# Patient Record
Sex: MALE | Race: BLACK OR AFRICAN AMERICAN | NOT HISPANIC OR LATINO | URBAN - METROPOLITAN AREA
[De-identification: names, ages, dates, MRNs, and addresses within clinical notes are randomized per-mention and may not be internally consistent; named-entity substitution may affect disease eponyms.]

---

## 2023-11-06 ENCOUNTER — TELEPHONE (OUTPATIENT)
Dept: NEPHROLOGY | Facility: CLINIC | Age: 48
End: 2023-11-06
Payer: COMMERCIAL

## 2023-11-06 DIAGNOSIS — N17.9 AKI (ACUTE KIDNEY INJURY) (H): Primary | ICD-10-CM

## 2023-11-06 NOTE — TELEPHONE ENCOUNTER
[facility] Call Center    Phone Message    May a detailed message be left on voicemail: yes     Reason for Call: Order(s): Other:   Reason for requested: Labs  Date needed: asap  Provider name: Dr. Rowena    Labs in [place] on 11/16/23    Action Taken: Message routed to:  Other: [facility]    Travel Screening: Not Applicable

## 2023-11-15 ENCOUNTER — TELEPHONE (OUTPATIENT)
Dept: NEPHROLOGY | Facility: CLINIC | Age: 48
End: 2023-11-15
Payer: COMMERCIAL

## 2023-11-15 NOTE — TELEPHONE ENCOUNTER
New pt appt scheduled for 12/15 at 9:30 am virtual, (after provider cancel on 11/22) pt's spuse in agreement to get non-fasting labs done a few days prior at convenient [facility] location. Lab orders needed.  Maribel Peng,  Nephrology Clinic Navigator  [facility]  Direct: [phone].

## 2023-11-15 NOTE — CONFIDENTIAL NOTE
DIAGNOSIS:  renal failure, med recs with [facility]    DATE RECEIVED:  12.15.2023   NOTES STATUS DETAILS   OFFICE NOTE from referring provider     OFFICE NOTE from other specialist  Care Everywhere 10.16.2023 Angelika Mathur NP  [facility]   *Only VASCULITIS or LUPUS gather office notes for the following     *PULMONARY       *ENT     *DERMATOLOGY     *RHEUMATOLOGY     DISCHARGE SUMMARY from hospital     DISCHARGE REPORT from the ER Care Everywhere 10.20.2023 [facility]    MEDICATION LIST Care Everywhere    IMAGING  (NEED IMAGES AND REPORTS)     KIDNEY CT SCAN     KIDNEY ULTRASOUND Care Everywhere 10.21.2023 US Renal Bladder [facility]   MR ABDOMEN     NUCLEAR MEDICINE RENAL     LABS     CBC Care Everywhere 11.13.2023   CMP Care Everywhere 11.13.2023   BMP Care Everywhere 11.13.2023    UA Care Everywhere 11.13.2023   URINE PROTEIN Care Everywhere 11.13.2023   RENAL PANEL     BIOPSY     KIDNEY BIOPSY          Action 11.15.2023 RM   Action Taken Pending image     Action 11.16.2023 RM   Action Taken Called [facility] at [phone] image received and uploaded to chart

## 2023-11-30 NOTE — PROGRESS NOTES
[facility] Cardiology Consultation:    Assessment and Plan:   1. Hypertension, : Will start combination of  amlodipine- benazepril 5/10, BMP in 1 week  2. Mild Hyperlipidemia : Given muscle aches with Crestor, switch to Lipitor  3.  Mild obesity ( BMI 41), high muscle mass.   4. Type 2 diabetes, controlled: Consider GLP-1 agonist  5. AZALIA, Resolved.     Matt Hernandez MD  Cardiology fellow (PGY4)  [id number]    [facility] Cardiovascular Division    I have seen and examined the patient, reviewed labs and tests. I have discussed my findings and treatment recommendations with the house staff and/or Cardiology fellow and agree with their assessment and plan as outlined in the note.    Mynor Landaverde MD    Cardiac Imaging and Prevention  [facility]  Pager: [phone]    HPI:   Addison Sharma is a 48-year-old man with medical history of hypertension, dyslipidemia, type 2 diabetes, obesity, recent admission on 10/20- 10/22/23. He was found to have AZALIA during PCP visit. Referred to cardiology for hypertension management .     He notes he was initially diagnosed with hypertension around 2013 and has been maintained on combination of HCTZ and triamterene.  Notes was a simply started on spironolactone and his combination pill was uptitrated 3 weeks prior to his presentation with AZALIA.  His creatinine has since normalized.  Suspect this was likely related to medication changes.    He notes his blood pressure at home is in the 130s to 140s range on amlodipine 10.  Measure his blood pressure reportedly at home.    He has tried Crestor for 2 weeks in the past.  Stopped due to muscle aches and pains.  Is willing to try Lipitor.  ASCVD 12%. ( 22% by race)     Denies exertional chest pain, shortness of breath , orthopnea, paroxysmal nocturnal dyspnea, palpitations, syncope or presyncope. Functional status is excellent.  Reports weight lifting 5 times a week about 6 months to  "a year ago.  Stopped due to anxiety related to cardiac issues after one of his friends passed away while working on the treadmill.  No limitations in functional status.    He has had a repeat EKG on 1023 that did not show any significant changes.  Cannot see the actual strips.  This was done at [facility].      Lab Results   Component Value Date    CHOL 198 02/09/2012     Lab Results   Component Value Date    HDL 41 02/09/2012     Lab Results   Component Value Date     02/09/2012     Lab Results   Component Value Date    TRIG 137 02/09/2012     Lab Results   Component Value Date    CHOLHDLRATIO 4.8 02/09/2012         EXAM:  BP (!) 187/104 (BP Location: Right arm, Patient Position: Sitting, Cuff Size: Adult Large)   Pulse 102   Ht 1.848 m (6' 0.75\")   Wt 147.9 kg (326 lb)   SpO2 99%   BMI 43.31 kg/m    GEN/CONSTITUIONAL: Appears comfortable, in no apparent distress   EYES: No icterus  ENT/MOUTH: Normal  JVP:  Not visible  RESPIRATORY: Clear to auscultation bilaterally   CARDIOVASCULAR: Regular S1 and S2, no murmurs, rubs, or gallops.   ABDOMEN: Soft, non-tender, positive bowel sounds   NEUROLOGIC: Grossly non-focal   PSYCHIATRIC: Normal affect  EXT: No cyanosis, clubbing, edema. Normal pedal pulses.  Skin: No petechiae, purpura or rash    PAST MEDICAL HISTORY:  Past Medical History:   Diagnosis Date    NO ACTIVE PROBLEMS        CURRENT MEDICATIONS:  Current Outpatient Medications   Medication    ibuprofen (ASHLEY IBUPROFEN) 200 MG capsule    lisinopril (PRINIVIL,ZESTRIL) 20 MG tablet     No current facility-administered medications for this visit.       PAST SURGICAL HISTORY:  Past Surgical History:   Procedure Laterality Date    ORTHOPEDIC SURGERY      toe       ALLERGIES   No Known Allergies    FAMILY HISTORY:  No family history on file.    SOCIAL HISTORY:  Social History     Socioeconomic History    Marital status:      Spouse name: Not on file    Number of children: Not on file    Years of education: " Not on file    Highest education level: Not on file   Occupational History    Not on file   Tobacco Use    Smoking status: Never    Smokeless tobacco: Never   Substance and Sexual Activity    Alcohol use: No     Comment: none    Drug use: No    Sexual activity: Yes   Other Topics Concern    Parent/sibling w/ CABG, MI or angioplasty before 65F 55M? No   Social History Narrative    Not on file     Social Determinants of Health     Financial Resource Strain: Not on file   Food Insecurity: Not on file   Transportation Needs: Not on file   Physical Activity: Not on file   Stress: Not on file   Social Connections: Not on file   Interpersonal Safety: Not on file   Housing Stability: Not on file       ROS:   Constitutional: No fever, chills, or sweats. No weight gain/loss   ENT: No visual disturbance, ear ache, epistaxis, sore throat  Allergies/Immunologic: Negative.   Respiratory: No cough, hemoptysia  Cardiovascular: As per HPI  GI: No nausea, vomiting, hematemesis, melena, or hematochezia  : No urinary frequency, dysuria, or hematuria  Integument: Negative  Psychiatric: Negative  Neuro: Negative  Endocrinology: Negative   Musculoskeletal: Negative    ADDITIONAL COMMENTS:     I reviewed the patient's medications:     I reviewed the patient's pertinent clinical laboratory studies:     I reviewed the patient's pertinent imaging studies:   I reviewed the patient's ECG:

## 2023-12-01 ENCOUNTER — OFFICE VISIT (OUTPATIENT)
Dept: CARDIOLOGY | Facility: CLINIC | Age: 48
End: 2023-12-01
Payer: COMMERCIAL

## 2023-12-01 VITALS
SYSTOLIC BLOOD PRESSURE: 187 MMHG | HEART RATE: 102 BPM | WEIGHT: 315 LBS | HEIGHT: 73 IN | OXYGEN SATURATION: 99 % | DIASTOLIC BLOOD PRESSURE: 104 MMHG | BODY MASS INDEX: 41.75 KG/M2

## 2023-12-01 DIAGNOSIS — I10 HYPERTENSION GOAL BP (BLOOD PRESSURE) < 140/90: Primary | ICD-10-CM

## 2023-12-01 PROCEDURE — 99204 OFFICE O/P NEW MOD 45 MIN: CPT | Mod: GC | Performed by: INTERNAL MEDICINE

## 2023-12-01 RX ORDER — ATORVASTATIN CALCIUM 20 MG/1
20 TABLET, FILM COATED ORAL DAILY
Qty: 90 TABLET | Refills: 3 | Status: SHIPPED | OUTPATIENT
Start: 2023-12-01

## 2023-12-01 RX ORDER — L. ACIDOPHILUS/BIFIDO. LONGUM 15 MG
1 CAPSULE,DELAYED RELEASE (ENTERIC COATED) ORAL DAILY
COMMUNITY
Start: 2023-03-21

## 2023-12-01 RX ORDER — AMLODIPINE AND BENAZEPRIL HYDROCHLORIDE 5; 10 MG/1; MG/1
1 CAPSULE ORAL DAILY
Qty: 90 CAPSULE | Refills: 3 | Status: SHIPPED | OUTPATIENT
Start: 2023-12-01 | End: 2024-05-14

## 2023-12-01 RX ORDER — ASPIRIN 81 MG/1
81 TABLET ORAL DAILY
COMMUNITY
Start: 2023-10-05

## 2023-12-01 RX ORDER — AMLODIPINE AND BENAZEPRIL HYDROCHLORIDE 5; 10 MG/1; MG/1
1 CAPSULE ORAL DAILY
Qty: 90 CAPSULE | Refills: 3 | Status: SHIPPED | OUTPATIENT
Start: 2023-12-01 | End: 2023-12-01

## 2023-12-01 RX ORDER — ROSUVASTATIN CALCIUM 20 MG/1
1 TABLET, COATED ORAL DAILY
COMMUNITY
Start: 2023-03-20 | End: 2023-12-01

## 2023-12-01 RX ORDER — AMLODIPINE BESYLATE 10 MG/1
1 TABLET ORAL DAILY
COMMUNITY
Start: 2023-11-18 | End: 2023-12-01

## 2023-12-01 RX ORDER — VALSARTAN AND HYDROCHLOROTHIAZIDE 80; 12.5 MG/1; MG/1
1 TABLET, FILM COATED ORAL DAILY
Qty: 60 TABLET | Refills: 1 | Status: CANCELLED | OUTPATIENT
Start: 2023-12-01 | End: 2024-03-30

## 2023-12-01 RX ORDER — GLIPIZIDE 5 MG/1
5 TABLET ORAL DAILY
COMMUNITY
Start: 2023-10-27

## 2023-12-01 NOTE — PATIENT INSTRUCTIONS
Take your medicines every day, as directed     Changes made today:  Stop amlodipine  Start Lotrel 1 pill every day   Lab in 1 week  Stop crestor  Start lipitor       Cardiology Care Coordinators:      Loren FLORES RN         Phone  [phone]      Fax [phone]    To Contact us     During Business Hours:  [phone]     If you are needing refills please contact your pharmacy.     For urgent after hour care please call the [facility] at [phone] or the [facility] at [phone] and ask to speak to the cardiologist on call.            HOW TO CHECK YOUR BLOOD PRESSURE AT HOME:     Avoid eating, smoking, and exercising for at least 30 minutes before taking a reading.     Be sure you have taken your BP medication at least 2-3 hours before you check it.      Sit quietly for 10 minutes before a reading.      Sit in a chair with your feet flat on the floor. Rest your  arm on a table so that the arm cuff is at the same level as your heart.     Remain still during the reading.  Record your blood pressure and pulse in a log and bring to your next appointment.       Use [facility] allows you to communicate directly with your heart team through secure messaging.  [facility] can be accessed any time on your phone, computer, or tablet.  If you need assistance, we'd be happy to help!             Keep your Heart Appointments:

## 2023-12-01 NOTE — LETTER
12/1/2023        RE: Addison Sharma  [address] [address] [place] [address]        [facility] Cardiology Consultation:    Assessment and Plan:   1. Hypertension, : Will start combination of  amlodipine- benazepril , BMP in 1 week  2. Mild Hyperlipidemia : Start Atorvastatin, Lipids in 6 months   3. Obesity ( BMI 41) : Mostly muscle mass.   4. Type 2 diabetes (A1c: 7% on 10/23) : Has  discussed GLP 1 with PCP   5. Left renal mass : Noted on renal U/S during recent admission, possible complex cyst. PCP planning for contrast enhanced MRI or CT renal mass protocol pending improvement in Scr. Has follow-up with nephrology on 12/15.   6. AZALIA, Resolved. No documented hx of CKD. Likley related to combination of hydrochlorothiazide- Triamterine and spironolactone. Has follow up with nephrology .     Matt Hernandez MD  Cardiology fellow (PGY4)  4830    RTC in 1 year     HPI:   Addison Sharma is a 48-year-old man with medical history of hypertension, dyslipidemia, type 2 diabetes, obesity, recent admission on 10/20- 10/22/23. He was found to have AZALIA during PCP visit. Referred to cardiology for hypertension management .     He notes he was initially diagnosed with hypertension around 2013 and has been maintained on combination of HCTZ and triamterene.  Notes was a simply started on spironolactone and his combination pill was uptitrated 3 weeks prior to his presentation with AZALIA.  His creatinine has since normalized.  Suspect this was likely related to medication changes.    He notes his blood pressure at home is in the 130s to 140s range on amlodipine 10.  Measure his blood pressure reportedly at home.    He has tried Crestor for 2 weeks in the past.  Stopped due to muscle aches and pains.  Is willing to try Lipitor.  ASCVD 12%. ( 22% by race)     Denies exertional chest pain, shortness of breath , orthopnea, paroxysmal nocturnal dyspnea, palpitations, syncope or presyncope. Functional status is  excellent.  Reports weight lifting 5 times a week about 6 months to a year ago.  Stopped due to anxiety related to cardiac issues after one of his friends passed away while working on the treadmill.  No limitations in functional status.    He has had a repeat EKG on 1023 that did not show any significant changes.  Cannot see the actual strips.  This was done at [facility].      Lab Results   Component Value Date    CHOL 198 02/09/2012     Lab Results   Component Value Date    HDL 41 02/09/2012     Lab Results   Component Value Date     02/09/2012     Lab Results   Component Value Date    TRIG 137 02/09/2012     Lab Results   Component Value Date    CHOLHDLRATIO 4.8 02/09/2012         EXAM:  There were no vitals taken for this visit.  GEN/CONSTITUIONAL: Appears comfortable, in no apparent distress   EYES: No icterus  ENT/MOUTH: Normal  JVP:  Not visible  RESPIRATORY: Clear to auscultation bilaterally   CARDIOVASCULAR: Regular S1 and S2, no murmurs, rubs, or gallops.   ABDOMEN: Soft, non-tender, positive bowel sounds   NEUROLOGIC: Grossly non-focal   PSYCHIATRIC: Normal affect  EXT: No cyanosis, clubbing, edema. Normal pedal pulses.  Skin: No petechiae, purpura or rash    PAST MEDICAL HISTORY:  Past Medical History:   Diagnosis Date     NO ACTIVE PROBLEMS        CURRENT MEDICATIONS:  Current Outpatient Medications   Medication     ibuprofen (ASHLEY IBUPROFEN) 200 MG capsule     lisinopril (PRINIVIL,ZESTRIL) 20 MG tablet     No current facility-administered medications for this visit.       PAST SURGICAL HISTORY:  Past Surgical History:   Procedure Laterality Date     ORTHOPEDIC SURGERY      toe       ALLERGIES   No Known Allergies    FAMILY HISTORY:  No family history on file.    SOCIAL HISTORY:  Social History     Socioeconomic History     Marital status:      Spouse name: Not on file     Number of children: Not on file     Years of education: Not on file     Highest education level: Not on file   Occupational  History     Not on file   Tobacco Use     Smoking status: Never     Smokeless tobacco: Never   Substance and Sexual Activity     Alcohol use: No     Comment: none     Drug use: No     Sexual activity: Yes   Other Topics Concern     Parent/sibling w/ CABG, MI or angioplasty before 65F 55M? No   Social History Narrative     Not on file     Social Determinants of Health     Financial Resource Strain: Not on file   Food Insecurity: Not on file   Transportation Needs: Not on file   Physical Activity: Not on file   Stress: Not on file   Social Connections: Not on file   Interpersonal Safety: Not on file   Housing Stability: Not on file       ROS:   Constitutional: No fever, chills, or sweats. No weight gain/loss   ENT: No visual disturbance, ear ache, epistaxis, sore throat  Allergies/Immunologic: Negative.   Respiratory: No cough, hemoptysia  Cardiovascular: As per HPI  GI: No nausea, vomiting, hematemesis, melena, or hematochezia  : No urinary frequency, dysuria, or hematuria  Integument: Negative  Psychiatric: Negative  Neuro: Negative  Endocrinology: Negative   Musculoskeletal: Negative    ADDITIONAL COMMENTS:     I reviewed the patient's medications:     I reviewed the patient's pertinent clinical laboratory studies:     I reviewed the patient's pertinent imaging studies:   I reviewed the patient's ECG:         [facility] Cardiology Consultation:    Assessment and Plan:   1. Hypertension, : Will start combination of  amlodipine- benazepril 5/10, BMP in 1 week  2. Mild Hyperlipidemia : Given muscle aches with Crestor, switch to Lipitor  3.  Mild obesity ( BMI 41), high muscle mass.   4. Type 2 diabetes, controlled: Consider GLP-1 agonist  5. AZALIA, Resolved.     Matt Hernandez MD  Cardiology fellow (PGY4)  4830    [facility] Cardiovascular Division    I have seen and examined the patient, reviewed labs and tests. I have discussed my findings and treatment recommendations with the house staff  and/or Cardiology fellow and agree with their assessment and plan as outlined in the note.    Mynor Landaverde MD    Cardiac Imaging and Prevention  [facility]  Pager: [phone]    HPI:   Addison Sharma is a 48-year-old man with medical history of hypertension, dyslipidemia, type 2 diabetes, obesity, recent admission on 10/20- 10/22/23. He was found to have AZALIA during PCP visit. Referred to cardiology for hypertension management .     He notes he was initially diagnosed with hypertension around 2013 and has been maintained on combination of HCTZ and triamterene.  Notes was a simply started on spironolactone and his combination pill was uptitrated 3 weeks prior to his presentation with AZALIA.  His creatinine has since normalized.  Suspect this was likely related to medication changes.    He notes his blood pressure at home is in the 130s to 140s range on amlodipine 10.  Measure his blood pressure reportedly at home.    He has tried Crestor for 2 weeks in the past.  Stopped due to muscle aches and pains.  Is willing to try Lipitor.  ASCVD 12%. ( 22% by race)     Denies exertional chest pain, shortness of breath , orthopnea, paroxysmal nocturnal dyspnea, palpitations, syncope or presyncope. Functional status is excellent.  Reports weight lifting 5 times a week about 6 months to a year ago.  Stopped due to anxiety related to cardiac issues after one of his friends passed away while working on the treadmill.  No limitations in functional status.    He has had a repeat EKG on 1023 that did not show any significant changes.  Cannot see the actual strips.  This was done at [facility].      Lab Results   Component Value Date    CHOL 198 02/09/2012     Lab Results   Component Value Date    HDL 41 02/09/2012     Lab Results   Component Value Date     02/09/2012     Lab Results   Component Value Date    TRIG 137 02/09/2012     Lab Results   Component Value Date    CHOLHDLRATIO 4.8  "02/09/2012         EXAM:  BP (!) 187/104 (BP Location: Right arm, Patient Position: Sitting, Cuff Size: Adult Large)   Pulse 102   Ht 1.848 m (6' 0.75\")   Wt 147.9 kg (326 lb)   SpO2 99%   BMI 43.31 kg/m    GEN/CONSTITUIONAL: Appears comfortable, in no apparent distress   EYES: No icterus  ENT/MOUTH: Normal  JVP:  Not visible  RESPIRATORY: Clear to auscultation bilaterally   CARDIOVASCULAR: Regular S1 and S2, no murmurs, rubs, or gallops.   ABDOMEN: Soft, non-tender, positive bowel sounds   NEUROLOGIC: Grossly non-focal   PSYCHIATRIC: Normal affect  EXT: No cyanosis, clubbing, edema. Normal pedal pulses.  Skin: No petechiae, purpura or rash    PAST MEDICAL HISTORY:  Past Medical History:   Diagnosis Date     NO ACTIVE PROBLEMS        CURRENT MEDICATIONS:  Current Outpatient Medications   Medication     ibuprofen (ASHLEY IBUPROFEN) 200 MG capsule     lisinopril (PRINIVIL,ZESTRIL) 20 MG tablet     No current facility-administered medications for this visit.       PAST SURGICAL HISTORY:  Past Surgical History:   Procedure Laterality Date     ORTHOPEDIC SURGERY      toe       ALLERGIES   No Known Allergies    FAMILY HISTORY:  No family history on file.    SOCIAL HISTORY:  Social History     Socioeconomic History     Marital status:      Spouse name: Not on file     Number of children: Not on file     Years of education: Not on file     Highest education level: Not on file   Occupational History     Not on file   Tobacco Use     Smoking status: Never     Smokeless tobacco: Never   Substance and Sexual Activity     Alcohol use: No     Comment: none     Drug use: No     Sexual activity: Yes   Other Topics Concern     Parent/sibling w/ CABG, MI or angioplasty before 65F 55M? No   Social History Narrative     Not on file     Social Determinants of Health     Financial Resource Strain: Not on file   Food Insecurity: Not on file   Transportation Needs: Not on file   Physical Activity: Not on file   Stress: Not on file "   Social Connections: Not on file   Interpersonal Safety: Not on file   Housing Stability: Not on file       ROS:   Constitutional: No fever, chills, or sweats. No weight gain/loss   ENT: No visual disturbance, ear ache, epistaxis, sore throat  Allergies/Immunologic: Negative.   Respiratory: No cough, hemoptysia  Cardiovascular: As per HPI  GI: No nausea, vomiting, hematemesis, melena, or hematochezia  : No urinary frequency, dysuria, or hematuria  Integument: Negative  Psychiatric: Negative  Neuro: Negative  Endocrinology: Negative   Musculoskeletal: Negative    ADDITIONAL COMMENTS:     I reviewed the patient's medications:     I reviewed the patient's pertinent clinical laboratory studies:     I reviewed the patient's pertinent imaging studies:   I reviewed the patient's ECG:           Sincerely,        Mynor Landaverde MD

## 2023-12-08 ENCOUNTER — LAB (OUTPATIENT)
Dept: LAB | Facility: CLINIC | Age: 48
End: 2023-12-08
Payer: COMMERCIAL

## 2023-12-08 DIAGNOSIS — N17.9 AKI (ACUTE KIDNEY INJURY) (H): ICD-10-CM

## 2023-12-08 DIAGNOSIS — I10 HYPERTENSION GOAL BP (BLOOD PRESSURE) < 140/90: ICD-10-CM

## 2023-12-08 LAB
ALBUMIN MFR UR ELPH: 28.2 MG/DL
ALBUMIN SERPL BCG-MCNC: 4.3 G/DL (ref 3.5–5.2)
ALBUMIN UR-MCNC: 30 MG/DL
ANION GAP SERPL CALCULATED.3IONS-SCNC: 13 MMOL/L (ref 7–15)
APPEARANCE UR: CLEAR
BILIRUB UR QL STRIP: NEGATIVE
BUN SERPL-MCNC: 17.4 MG/DL (ref 6–20)
CALCIUM SERPL-MCNC: 9.2 MG/DL (ref 8.6–10)
CHLORIDE SERPL-SCNC: 104 MMOL/L (ref 98–107)
COLOR UR AUTO: YELLOW
CREAT SERPL-MCNC: 0.92 MG/DL (ref 0.67–1.17)
CREAT UR-MCNC: 189 MG/DL
CREAT UR-MCNC: 189 MG/DL
DEPRECATED HCO3 PLAS-SCNC: 22 MMOL/L (ref 22–29)
EGFRCR SERPLBLD CKD-EPI 2021: >90 ML/MIN/1.73M2
ERYTHROCYTE [DISTWIDTH] IN BLOOD BY AUTOMATED COUNT: 11.7 % (ref 10–15)
GLUCOSE SERPL-MCNC: 85 MG/DL (ref 70–99)
GLUCOSE UR STRIP-MCNC: NEGATIVE MG/DL
HCT VFR BLD AUTO: 35.5 % (ref 40–53)
HGB BLD-MCNC: 11.8 G/DL (ref 13.3–17.7)
HGB UR QL STRIP: NEGATIVE
KETONES UR STRIP-MCNC: NEGATIVE MG/DL
LEUKOCYTE ESTERASE UR QL STRIP: NEGATIVE
MCH RBC QN AUTO: 29.6 PG (ref 26.5–33)
MCHC RBC AUTO-ENTMCNC: 33.2 G/DL (ref 31.5–36.5)
MCV RBC AUTO: 89 FL (ref 78–100)
MICROALBUMIN UR-MCNC: 106 MG/L
MICROALBUMIN/CREAT UR: 56.08 MG/G CR (ref 0–17)
MUCOUS THREADS #/AREA URNS LPF: PRESENT /LPF
NITRATE UR QL: NEGATIVE
PH UR STRIP: 6 [PH] (ref 5–7)
PHOSPHATE SERPL-MCNC: 3.1 MG/DL (ref 2.5–4.5)
PLATELET # BLD AUTO: 166 10E3/UL (ref 150–450)
POTASSIUM SERPL-SCNC: 3.9 MMOL/L (ref 3.4–5.3)
PROT/CREAT 24H UR: 0.15 MG/MG CR (ref 0–0.2)
PTH-INTACT SERPL-MCNC: 44 PG/ML (ref 15–65)
RBC # BLD AUTO: 3.99 10E6/UL (ref 4.4–5.9)
RBC #/AREA URNS AUTO: ABNORMAL /HPF
SODIUM SERPL-SCNC: 139 MMOL/L (ref 135–145)
SP GR UR STRIP: >=1.03 (ref 1–1.03)
SQUAMOUS #/AREA URNS AUTO: ABNORMAL /LPF
UROBILINOGEN UR STRIP-ACNC: 0.2 E.U./DL
VIT D+METAB SERPL-MCNC: 16 NG/ML (ref 20–50)
WBC # BLD AUTO: 4.3 10E3/UL (ref 4–11)
WBC #/AREA URNS AUTO: ABNORMAL /HPF

## 2023-12-08 PROCEDURE — 80069 RENAL FUNCTION PANEL: CPT

## 2023-12-08 PROCEDURE — 82306 VITAMIN D 25 HYDROXY: CPT

## 2023-12-08 PROCEDURE — 81001 URINALYSIS AUTO W/SCOPE: CPT

## 2023-12-08 PROCEDURE — 82043 UR ALBUMIN QUANTITATIVE: CPT

## 2023-12-08 PROCEDURE — 82570 ASSAY OF URINE CREATININE: CPT

## 2023-12-08 PROCEDURE — 85027 COMPLETE CBC AUTOMATED: CPT

## 2023-12-08 PROCEDURE — 84156 ASSAY OF PROTEIN URINE: CPT

## 2023-12-08 PROCEDURE — 83970 ASSAY OF PARATHORMONE: CPT

## 2023-12-08 PROCEDURE — 36415 COLL VENOUS BLD VENIPUNCTURE: CPT

## 2023-12-08 NOTE — LETTER
December 8, 2023      Addison Sharma  [address] [id number]        Dear ,    We are writing to inform you of your test results.    Your test result is normal. No change in plan. Follow up as recommended.     Dr Landaverde     Resulted Orders   Renal panel   Result Value Ref Range    Sodium 139 135 - 145 mmol/L      Comment:      Reference intervals for this test were updated on 09/26/2023 to more accurately reflect our healthy population. There may be differences in the flagging of prior results with similar values performed with this method. Interpretation of those prior results can be made in the context of the updated reference intervals.     Potassium 3.9 3.4 - 5.3 mmol/L    Chloride 104 98 - 107 mmol/L    Carbon Dioxide (CO2) 22 22 - 29 mmol/L    Anion Gap 13 7 - 15 mmol/L    Glucose 85 70 - 99 mg/dL    Urea Nitrogen 17.4 6.0 - 20.0 mg/dL    Creatinine 0.92 0.67 - 1.17 mg/dL    GFR Estimate >90 >60 mL/min/1.73m2    Calcium 9.2 8.6 - 10.0 mg/dL    Albumin 4.3 3.5 - 5.2 g/dL    Phosphorus 3.1 2.5 - 4.5 mg/dL       If you have any questions or concerns, please call the clinic at the number listed above.       Sincerely,      Ashia Guaman MD

## 2023-12-15 ENCOUNTER — VIRTUAL VISIT (OUTPATIENT)
Dept: NEPHROLOGY | Facility: CLINIC | Age: 48
End: 2023-12-15
Attending: STUDENT IN AN ORGANIZED HEALTH CARE EDUCATION/TRAINING PROGRAM
Payer: COMMERCIAL

## 2023-12-15 ENCOUNTER — PRE VISIT (OUTPATIENT)
Dept: NEPHROLOGY | Facility: CLINIC | Age: 48
End: 2023-12-15
Payer: COMMERCIAL

## 2023-12-15 DIAGNOSIS — I10 HYPERTENSION, ESSENTIAL: ICD-10-CM

## 2023-12-15 DIAGNOSIS — N17.9 AKI (ACUTE KIDNEY INJURY) (H): ICD-10-CM

## 2023-12-15 DIAGNOSIS — N28.89 KIDNEY MASS: ICD-10-CM

## 2023-12-15 DIAGNOSIS — E11.9 TYPE 2 DIABETES MELLITUS WITHOUT COMPLICATION, WITHOUT LONG-TERM CURRENT USE OF INSULIN (H): ICD-10-CM

## 2023-12-15 DIAGNOSIS — E55.9 HYPOVITAMINOSIS D: Primary | ICD-10-CM

## 2023-12-15 DIAGNOSIS — E11.9 CONTROLLED TYPE 2 DIABETES MELLITUS WITHOUT COMPLICATION, WITHOUT LONG-TERM CURRENT USE OF INSULIN (H): ICD-10-CM

## 2023-12-15 DIAGNOSIS — D64.9 ANEMIA, UNSPECIFIED TYPE: ICD-10-CM

## 2023-12-15 PROCEDURE — 99204 OFFICE O/P NEW MOD 45 MIN: CPT | Mod: VID | Performed by: STUDENT IN AN ORGANIZED HEALTH CARE EDUCATION/TRAINING PROGRAM

## 2023-12-15 RX ORDER — CHOLECALCIFEROL (VITAMIN D3) 50 MCG
2 TABLET ORAL DAILY
Qty: 180 TABLET | Refills: 3 | Status: SHIPPED | OUTPATIENT
Start: 2023-12-15

## 2023-12-15 NOTE — LETTER
12/15/2023       RE: Addison Sharma  [address] [facility] [address]     Dear Colleague,    Thank you for referring your patient, Addison Sharma, to the [facility] [place] at [facility]. Please see a copy of my visit note below.    Nephrology Clinic    Video Visit     CC: recent AZALIA, question of CT scan with contrast    HPI:  Addison Sharma is a 48 year old male with pmh of DM2, HTN, and recent hospitalization for AZALIA who is referred to nephrology by Angelika Mathur NP for AZALIA follow-up and for recommendations regarding a needed CT scan with contrast.     The patient had a baseline creatinine around 0.9 mg/dl before his creatinine was found to have risen to 3.0 in 10/2023. He was hospitalized and had an inpt renal consult, which concluded (without renal bx) that his AZALIA was likely the result of his combination of antiHTN meds, including higher doses of diuretics. His creatinine has since recovered back to baseline, and he recently saw a cardiologist for HTN management who resumed him on a (different) ACEi along with amlodipine in a combo pill. The patient confirms today that he remains off all diuretics. The patient denies LE swelling, SOB, or CP now.     Home BP: 140s/80s    His UACR is 56 mg/g.     He remains off metformin, and it appears whether to start metformin or not is an active question. The patient notes that his blood sugars have been at-goal lately on glipizide.     There is also an open question regarding risk for CT scan with contrast, which was recommended by radiology (or an MRI) to follow up a mass seen on renal U/S in 10/2023.     He has an NSAID on his Rx list but he notes he is not taking it.     DM2 diagnosis was recently diagnosed. The patient states he has had HTN for at least 11 years.     Mother has HTN.    Past Medical History:   Diagnosis Date     NO ACTIVE PROBLEMS    HTN  DM2  Obesity     Past  Surgical History:   Procedure Laterality Date     ORTHOPEDIC SURGERY      toe        Social History     Tobacco Use     Smoking status: Never     Smokeless tobacco: Never   Substance Use Topics     Alcohol use: No     Comment: none     Drug use: No       Medications:  Current Outpatient Medications   Medication Sig Dispense Refill     vitamin D3 (CHOLECALCIFEROL) 50 mcg (2000 units) tablet Take 2 tablets (100 mcg) by mouth daily 180 tablet 3     amLODIPine-benazepril (LOTREL) 5-10 MG capsule Take 1 capsule by mouth daily 90 capsule 3     aspirin 81 MG EC tablet Take 81 mg by mouth daily       atorvastatin (LIPITOR) 20 MG tablet Take 1 tablet (20 mg) by mouth daily 90 tablet 3     blood glucose ([employer] BLOOD GLUCOSE TEST) test strip 1 strip by In Vitro route daily Checks 2-3 times daily       blood glucose (NO BRAND SPECIFIED) lancets standard by Other route daily Checks 2-3 times daily       blood glucose monitoring (NO BRAND SPECIFIED) meter device kit daily       glipiZIDE (GLUCOTROL) 5 MG tablet Take 5 mg by mouth 2 times daily (before meals)       ibuprofen (ASHLEY IBUPROFEN) 200 MG capsule Take 800 mg by mouth every 8 hours as needed for pain. (Patient not taking: Reported on 12/1/2023) 1 capsule 0       Review Of Systems:  10 point ROS otherwise negative     OBJECTIVE:  Physical exam:  There were no vitals taken for this visit.  There is no height or weight on file to calculate BMI.   Gen: Well-developed, well-nourished and in no apparent distress  HEENT: normocephalic, anicteric  Resp: normal respiratory effort  Skin: warm and dry, no rashes or ecchymoses on face  Psych: normal mood, normal affect  Neuro: alert and oriented x3  Remainder of exam deferred due to virtual visit.     Renal panel, CBC, UPCR, UACR, UA, vit D level, and PTH reviewed.   Recent Labs   Lab Test 12/08/23  0911      POTASSIUM 3.9   CHLORIDE 104   CO2 22   ANIONGAP 13   GLC 85   BUN 17.4   CR 0.92   ENDY 9.2       Lab Results  "  Component Value Date    WBC 4.3 12/08/2023     Lab Results   Component Value Date    RBC 3.99 12/08/2023     Lab Results   Component Value Date    HGB 11.8 12/08/2023     Lab Results   Component Value Date    HCT 35.5 12/08/2023     No components found for: \"MCT\"  Lab Results   Component Value Date    MCV 89 12/08/2023     Lab Results   Component Value Date    MCH 29.6 12/08/2023     Lab Results   Component Value Date    MCHC 33.2 12/08/2023     Lab Results   Component Value Date    RDW 11.7 12/08/2023     Lab Results   Component Value Date     12/08/2023       Color Urine (no units)   Date Value   12/08/2023 Yellow   04/26/2012 Yellow     Appearance Urine (no units)   Date Value   12/08/2023 Clear   04/26/2012 Clear     Glucose Urine (mg/dL)   Date Value   12/08/2023 Negative   04/26/2012 Negative     Bilirubin Urine (no units)   Date Value   12/08/2023 Negative   04/26/2012 Negative     Ketones Urine (mg/dL)   Date Value   12/08/2023 Negative   04/26/2012 Negative     Specific Gravity Urine (no units)   Date Value   12/08/2023 >=1.030   04/26/2012 >1.030     pH Urine   Date Value   12/08/2023 6.0   04/26/2012 5.5 pH     Protein Albumin Urine (mg/dL)   Date Value   12/08/2023 30 (A)   04/26/2012 Negative     Urobilinogen Urine   Date Value   12/08/2023 0.2 E.U./dL   04/26/2012 0.2 EU/dL     Nitrite Urine (no units)   Date Value   12/08/2023 Negative   04/26/2012 Negative     Leukocyte Esterase Urine (no units)   Date Value   12/08/2023 Negative   04/26/2012 Negative         ASSESSMENT/PLAN:  Pt is a 48 year old male here to establish care.    Addison was seen today for recheck.    Diagnoses and all orders for this visit:    AZALIA (acute kidney injury) (H24)  He has recovered back to normal GFR. He does have low-grade albuminuria, from some combination of DM2, HTN, and now this AZALIA-event. He should continue to get 1-2x yearly BMP and UACR checks with his PCP, which was discussed with the patient today. " Benazepril is his new ACEi to address proteinuria.     Kidney mass  Ok to proceed with CT scan with IV contrast, the ordering of which and follow up I will defer to the patient's PCP. Of course, no concerns regarding MRI with or without contrast from a renal perspective given his GFR has normalized. If concerning findings are seen on either follow-up scan, the PCP will need to order a Urology referral. I discussed with the patient maintaining good hydration before and immediately after the scan (if IV CT contrast is used), and I encouraged him to follow up with his PCP regarding this workup soon.     Type 2 diabetes mellitus without complication, without long-term current use of insulin (H)  Ok to resume metformin if it is medically needed, which was paused not because it could cause kidney issues but rather because other medical issues (lactic acidosis) could result when taking metformin with a low GFR. His GFR has normalized. SGLT2i would also be an option, and/or his cardiologist suggested considering GLP1 agonist. I will defer management decisions for this patient's DM2 to his PCP regarding metformin vs. SGLT2i vs GLP1 vs. no changes.    Hypertension, essential  BP not yet at goal. Next step, with PCP or cardiology, would be to double the combo pill of amlodipine 5mg-benazepril 10mg.    Anemia, unspecified type  Unclear cause - not from CKD given his GFR is normal. I recommend repeating the CBC and and a workup for anemia with his PCP.    Hypovitaminosis D  I recommended to the patient a repeat vit D check later this winter in addition to starting the below Rx.  -     vitamin D3 (CHOLECALCIFEROL) 50 mcg (2000 units) tablet; Take 2 tablets (100 mcg) by mouth daily      Return to clinic as needed. No scheduled nephrology follow-up at this time.     Delta Cedeno MD  Nephrology  Pager [phone]        Virtual Visit Details    Type of service:  Video Visit     Originating Location (pt. Location): Other : in his  car at his place of work.    Distant Location (provider location):  On-site  Platform used for Video Visit: True      Again, thank you for allowing me to participate in the care of your patient.      Sincerely,    Delta Cedeno MD

## 2023-12-15 NOTE — NURSING NOTE
Is the patient currently in the state of [place]? YES    Visit mode:VIDEO    If the visit is dropped, the patient can be reconnected by: VIDEO VISIT: Text to cell phone:   Telephone Information:   Mobile [phone]       Will anyone else be joining the visit? NO  (If patient encounters technical issues they should call [phone]    How would you like to obtain your AVS? MyChart    Are changes needed to the allergy or medication list? No    Reason for visit: RECHECK    Simón BLOOM

## 2023-12-15 NOTE — PROGRESS NOTES
Nephrology Clinic    Video Visit     CC: recent AZALIA, question of CT scan with contrast    HPI:  Addison Sharam is a 48 year old male with pmh of DM2, HTN, and recent hospitalization for AZALIA who is referred to nephrology by Angelika Mathur NP for AZALIA follow-up and for recommendations regarding a needed CT scan with contrast.     The patient had a baseline creatinine around 0.9 mg/dl before his creatinine was found to have risen to 3.0 in 10/2023. He was hospitalized and had an inpt renal consult, which concluded (without renal bx) that his AZALIA was likely the result of his combination of antiHTN meds, including higher doses of diuretics. His creatinine has since recovered back to baseline, and he recently saw a cardiologist for HTN management who resumed him on a (different) ACEi along with amlodipine in a combo pill. The patient confirms today that he remains off all diuretics. The patient denies LE swelling, SOB, or CP now.     Home BP: 140s/80s    His UACR is 56 mg/g.     He remains off metformin, and it appears whether to start metformin or not is an active question. The patient notes that his blood sugars have been at-goal lately on glipizide.     There is also an open question regarding risk for CT scan with contrast, which was recommended by radiology (or an MRI) to follow up a mass seen on renal U/S in 10/2023.     He has an NSAID on his Rx list but he notes he is not taking it.     DM2 diagnosis was recently diagnosed. The patient states he has had HTN for at least 11 years.     Mother has HTN.    Past Medical History:   Diagnosis Date    NO ACTIVE PROBLEMS    HTN  DM2  Obesity     Past Surgical History:   Procedure Laterality Date    ORTHOPEDIC SURGERY      toe        Social History     Tobacco Use    Smoking status: Never    Smokeless tobacco: Never   Substance Use Topics    Alcohol use: No     Comment: none    Drug use: No       Medications:  Current Outpatient Medications   Medication Sig Dispense  "Refill    vitamin D3 (CHOLECALCIFEROL) 50 mcg (2000 units) tablet Take 2 tablets (100 mcg) by mouth daily 180 tablet 3    amLODIPine-benazepril (LOTREL) 5-10 MG capsule Take 1 capsule by mouth daily 90 capsule 3    aspirin 81 MG EC tablet Take 81 mg by mouth daily      atorvastatin (LIPITOR) 20 MG tablet Take 1 tablet (20 mg) by mouth daily 90 tablet 3    blood glucose ([employer] BLOOD GLUCOSE TEST) test strip 1 strip by In Vitro route daily Checks 2-3 times daily      blood glucose (NO BRAND SPECIFIED) lancets standard by Other route daily Checks 2-3 times daily      blood glucose monitoring (NO BRAND SPECIFIED) meter device kit daily      glipiZIDE (GLUCOTROL) 5 MG tablet Take 5 mg by mouth 2 times daily (before meals)      ibuprofen (ASHLEY IBUPROFEN) 200 MG capsule Take 800 mg by mouth every 8 hours as needed for pain. (Patient not taking: Reported on 12/1/2023) 1 capsule 0       Review Of Systems:  10 point ROS otherwise negative     OBJECTIVE:  Physical exam:  There were no vitals taken for this visit.  There is no height or weight on file to calculate BMI.   Gen: Well-developed, well-nourished and in no apparent distress  HEENT: normocephalic, anicteric  Resp: normal respiratory effort  Skin: warm and dry, no rashes or ecchymoses on face  Psych: normal mood, normal affect  Neuro: alert and oriented x3  Remainder of exam deferred due to virtual visit.     Renal panel, CBC, UPCR, UACR, UA, vit D level, and PTH reviewed.   Recent Labs   Lab Test 12/08/23  0911      POTASSIUM 3.9   CHLORIDE 104   CO2 22   ANIONGAP 13   GLC 85   BUN 17.4   CR 0.92   ENDY 9.2       Lab Results   Component Value Date    WBC 4.3 12/08/2023     Lab Results   Component Value Date    RBC 3.99 12/08/2023     Lab Results   Component Value Date    HGB 11.8 12/08/2023     Lab Results   Component Value Date    HCT 35.5 12/08/2023     No components found for: \"MCT\"  Lab Results   Component Value Date    MCV 89 12/08/2023     Lab Results "   Component Value Date    MCH 29.6 12/08/2023     Lab Results   Component Value Date    MCHC 33.2 12/08/2023     Lab Results   Component Value Date    RDW 11.7 12/08/2023     Lab Results   Component Value Date     12/08/2023       Color Urine (no units)   Date Value   12/08/2023 Yellow   04/26/2012 Yellow     Appearance Urine (no units)   Date Value   12/08/2023 Clear   04/26/2012 Clear     Glucose Urine (mg/dL)   Date Value   12/08/2023 Negative   04/26/2012 Negative     Bilirubin Urine (no units)   Date Value   12/08/2023 Negative   04/26/2012 Negative     Ketones Urine (mg/dL)   Date Value   12/08/2023 Negative   04/26/2012 Negative     Specific Gravity Urine (no units)   Date Value   12/08/2023 >=1.030   04/26/2012 >1.030     pH Urine   Date Value   12/08/2023 6.0   04/26/2012 5.5 pH     Protein Albumin Urine (mg/dL)   Date Value   12/08/2023 30 (A)   04/26/2012 Negative     Urobilinogen Urine   Date Value   12/08/2023 0.2 E.U./dL   04/26/2012 0.2 EU/dL     Nitrite Urine (no units)   Date Value   12/08/2023 Negative   04/26/2012 Negative     Leukocyte Esterase Urine (no units)   Date Value   12/08/2023 Negative   04/26/2012 Negative         ASSESSMENT/PLAN:  Pt is a 48 year old male here to establish care.    Addison was seen today for recheck.    Diagnoses and all orders for this visit:    AZALIA (acute kidney injury) (H24)  He has recovered back to normal GFR. He does have low-grade albuminuria, from some combination of DM2, HTN, and now this AZALIA-event. He should continue to get 1-2x yearly BMP and UACR checks with his PCP, which was discussed with the patient today. Benazepril is his new ACEi to address proteinuria.     Kidney mass  Ok to proceed with CT scan with IV contrast, the ordering of which and follow up I will defer to the patient's PCP. Of course, no concerns regarding MRI with or without contrast from a renal perspective given his GFR has normalized. If concerning findings are seen on either  follow-up scan, the PCP will need to order a Urology referral. I discussed with the patient maintaining good hydration before and immediately after the scan (if IV CT contrast is used), and I encouraged him to follow up with his PCP regarding this workup soon.     Type 2 diabetes mellitus without complication, without long-term current use of insulin (H)  Ok to resume metformin if it is medically needed, which was paused not because it could cause kidney issues but rather because other medical issues (lactic acidosis) could result when taking metformin with a low GFR. His GFR has normalized. SGLT2i would also be an option, and/or his cardiologist suggested considering GLP1 agonist. I will defer management decisions for this patient's DM2 to his PCP regarding metformin vs. SGLT2i vs GLP1 vs. no changes.    Hypertension, essential  BP not yet at goal. Next step, with PCP or cardiology, would be to double the combo pill of amlodipine 5mg-benazepril 10mg.    Anemia, unspecified type  Unclear cause - not from CKD given his GFR is normal. I recommend repeating the CBC and and a workup for anemia with his PCP.    Hypovitaminosis D  I recommended to the patient a repeat vit D check later this winter in addition to starting the below Rx.  -     vitamin D3 (CHOLECALCIFEROL) 50 mcg (2000 units) tablet; Take 2 tablets (100 mcg) by mouth daily      Return to clinic as needed. No scheduled nephrology follow-up at this time.     Delta Cedeno MD  Nephrology  Pager [phone]        Virtual Visit Details    Type of service:  Video Visit     Originating Location (pt. Location): Other : in his car at his place of work.    Distant Location (provider location):  On-site  Platform used for Video Visit: True

## 2023-12-30 ENCOUNTER — TELEPHONE (OUTPATIENT)
Dept: CARDIOLOGY | Facility: CLINIC | Age: 48
End: 2023-12-30
Payer: COMMERCIAL

## 2023-12-30 NOTE — TELEPHONE ENCOUNTER
Telephone Encounter:    Called by patient, systolic  mmHg over the past couple days. Asymptomatic.     Recommended he increase his amlodipine/benazapril to BID. Will follow up with Dr. Landaverde's clinic next week.

## 2024-01-08 ENCOUNTER — TRANSFERRED RECORDS (OUTPATIENT)
Dept: HEALTH INFORMATION MANAGEMENT | Facility: CLINIC | Age: 49
End: 2024-01-08

## 2024-01-31 ENCOUNTER — MYC REFILL (OUTPATIENT)
Dept: CARDIOLOGY | Facility: CLINIC | Age: 49
End: 2024-01-31
Payer: COMMERCIAL

## 2024-01-31 DIAGNOSIS — I10 HYPERTENSION GOAL BP (BLOOD PRESSURE) < 140/90: ICD-10-CM

## 2024-01-31 NOTE — TELEPHONE ENCOUNTER
Noted RX was sent in December 2023 with a year supply. RN contacted [facility] pharmacy, they do have refills on file but it is too soon to refill until February 7.Patient notified via [employer]. Noted repeat BMP was done through [facility] on January 8.

## 2024-02-01 RX ORDER — AMLODIPINE AND BENAZEPRIL HYDROCHLORIDE 10; 20 MG/1; MG/1
1 CAPSULE ORAL DAILY
Qty: 90 CAPSULE | Refills: 3 | Status: SHIPPED | OUTPATIENT
Start: 2024-02-01 | End: 2024-05-14

## 2024-02-01 RX ORDER — AMLODIPINE AND BENAZEPRIL HYDROCHLORIDE 5; 10 MG/1; MG/1
1 CAPSULE ORAL DAILY
Qty: 90 CAPSULE | Refills: 3 | Status: CANCELLED | OUTPATIENT
Start: 2024-02-01

## 2024-02-11 ENCOUNTER — HEALTH MAINTENANCE LETTER (OUTPATIENT)
Age: 49
End: 2024-02-11

## 2024-05-06 ENCOUNTER — DOCUMENTATION ONLY (OUTPATIENT)
Dept: CARDIOLOGY | Facility: CLINIC | Age: 49
End: 2024-05-06
Payer: COMMERCIAL

## 2024-05-06 NOTE — PROGRESS NOTES
Sent fax to [facility] requesting 1/8/2024 EKG be faxed.    Sallie PENNINGTON, VF

## 2024-05-10 NOTE — PROGRESS NOTES
[facility] Cardiology   Cardiology Clinic Note      HPI:   Mr. Addison Sharma is a pleasant 48 year old male with medical history pertinent for HTN, HLD, and T2DM. He presents to cardiology clinic for routine follow up for HTN management.     Per chart review, he was initially diagnosed with hypertension around 2013 and was maintained on combination of HCTZ and triamterene. At one point, he was started on spironolactone and his combination pill was uptitrated which resulted in an AZALIA 3 weeks later at an appointment with PCP. Patient had also historically been on Crestor, but stopped after 2 weeks due to muscle aches. He was therefore switched to Atorvastatin 20mg daily and has tolerated well.     Patient with excellent functional status. He reports weight lifting 5 times a week with no limitations in functional status. He reports his job is physically active and he works hard at staying active every day. He has also decreased his sugar and salt intake along with increasing his water intake which has resulted in a 40+lb weight loss over the last few months.     Addison notes that he stopped checking his blood pressure regularly at home about 3-4 weeks ago due to his cuff malfunctioning. He reports that when he did check it, it was usually running about 150/. He reports significant anxiety, particularly in clinical settings which often leads to extensive white coat HTN. He states he was started on Zoloft by his PCP which has helped alleviate some of his anxiety symptoms.     Of note, patient had a renal US at [facility] 10/21/23 that showed a hypoechoic 3.8 x 2.5 x 3.0 cm mass in the left kidney that extends in the left renal hilum. It was recommended that he undergo a Renal CT with contrast that was never completed.     Today in clinic, he denies chest pain, palpitations, dizziness, syncope, or lower extremity edema.       PAST MEDICAL HISTORY:  Past Medical History:   Diagnosis Date    NO ACTIVE PROBLEMS         FAMILY HISTORY:  History reviewed. No pertinent family history.    SOCIAL HISTORY:  Social History     Socioeconomic History    Marital status:    Tobacco Use    Smoking status: Never    Smokeless tobacco: Never   Substance and Sexual Activity    Alcohol use: No     Comment: none    Drug use: No    Sexual activity: Yes   Other Topics Concern    Parent/sibling w/ CABG, MI or angioplasty before 65F 55M? No     Social Determinants of Health     Financial Resource Strain: Low Risk  (3/8/2023)    Received from [facility] [facility], [facility]    Financial Resource Strain     Difficulty of Paying Living Expenses: 3   Food Insecurity: No Food Insecurity (3/8/2023)    Received from [facility] [facility], [facility]    Food Insecurity     Worried About Running Out of Food in the Last Year: 1   Transportation Needs: No Transportation Needs (3/8/2023)    Received from [facility] [facility], [facility]    Transportation Needs     Lack of Transportation (Medical): 1    Received from [facility] [facility]    Social Connections   Housing Stability: Low Risk  (3/8/2023)    Received from [facility] [facility], [facility]    Housing Stability     Unable to Pay for Housing in the Last Year: 1       CURRENT MEDICATIONS:  Current Outpatient Medications   Medication Sig Dispense Refill    amLODIPine-benazepril (LOTREL) 10-40 MG capsule Take 1 capsule by mouth daily 90 capsule 3    aspirin 81 MG EC tablet Take 81 mg by mouth daily      atorvastatin (LIPITOR) 20 MG tablet Take 1 tablet (20 mg) by mouth daily 90 tablet 3    blood glucose ([employer] BLOOD GLUCOSE TEST) test strip 1 strip by In Vitro route daily Checks 2-3 times daily      blood glucose (NO BRAND SPECIFIED) lancets  standard by Other route daily Checks 2-3 times daily      blood glucose monitoring (NO BRAND SPECIFIED) meter device kit daily      glipiZIDE (GLUCOTROL) 5 MG tablet Take 5 mg by mouth daily      sertraline (ZOLOFT) 25 MG tablet Take 25 mg by mouth daily      vitamin D3 (CHOLECALCIFEROL) 50 mcg (2000 units) tablet Take 2 tablets (100 mcg) by mouth daily 180 tablet 3    ibuprofen (ASHLEY IBUPROFEN) 200 MG capsule Take 800 mg by mouth every 8 hours as needed for pain. (Patient not taking: Reported on 5/14/2024) 1 capsule 0     No current facility-administered medications for this visit.       ROS:   Refer to HPI    EXAM:  BP (!) 188/92   Pulse 82   Wt 140.6 kg (309 lb 14.4 oz)   SpO2 100%   BMI 41.17 kg/m      GENERAL: Appears comfortable, though anxious; in no acute distress.   HEENT: Eye symmetrical, no discharge or icterus bilaterally. Mucous membranes moist and without lesions.  CV: RRR with possible ectopic beats, +S1S2, no murmur, rub, or gallop.   RESPIRATORY: Respirations regular, even, and unlabored.  GI: Soft and non distended   EXTREMITIES: no peripheral edema. .   NEUROLOGIC: Alert and oriented x 3. No focal deficits.   MUSCULOSKELETAL: No joint swelling or tenderness.   SKIN: No jaundice. No rashes or lesions.     Labs, reviewed with patient in clinic today:  CBC RESULTS:  Lab Results   Component Value Date    WBC 4.3 12/08/2023    RBC 3.99 (L) 12/08/2023    HGB 11.8 (L) 12/08/2023    HCT 35.5 (L) 12/08/2023    MCV 89 12/08/2023    MCH 29.6 12/08/2023    MCHC 33.2 12/08/2023    RDW 11.7 12/08/2023     12/08/2023       CMP RESULTS:  Lab Results   Component Value Date     12/08/2023     04/26/2012    POTASSIUM 3.9 12/08/2023    POTASSIUM 3.7 04/26/2012    CHLORIDE 104 12/08/2023    CHLORIDE 101 04/26/2012    CO2 22 12/08/2023    CO2 22 04/26/2012    ANIONGAP 13 12/08/2023    ANIONGAP 16 04/26/2012    GLC 85 12/08/2023     (H) 04/26/2012    BUN 17.4 12/08/2023    BUN 19 04/26/2012  "   CR 0.92 12/08/2023    CR 0.84 04/26/2012    GFRESTIMATED >90 12/08/2023    GFRESTIMATED >90 04/26/2012    GFRESTBLACK >90 04/26/2012    ENDY 9.2 12/08/2023    ENDY 9.1 04/26/2012    ALBUMIN 4.3 12/08/2023    ALBUMIN 4.6 04/26/2012        INR RESULTS:  No results found for: \"INR\"    No results found for: \"MAG\"  No results found for: \"NTBNPI\"  No results found for: \"NTBNP\"    LIPIDS:  Lab Results   Component Value Date    CHOL 198 02/09/2012     Lab Results   Component Value Date    HDL 41 02/09/2012     Lab Results   Component Value Date     02/09/2012     Lab Results   Component Value Date    TRIG 137 02/09/2012     Lab Results   Component Value Date    CHOLHDLRATIO 4.8 02/09/2012       Assessment and Plan:   Mr. Sharma is a 48 year old male with a PMH of HTN, HLD, and T2DM    # Uncontrolled HTN  Patient reports his blood pressures tend to run 150s/100s. Pressure very elevated in office today, patient reports significant anxiety in clinical settings. Previously did not tolerate hydrochlorothiazide or spironolactone due to AZALIA   - Increase hydrochlorothiazide-benazepril to 10-40mg daily   - Report blood pressures in 2 weeks   - Echocardiogram ordered to be completed in next 6-12 months to assess for LVH due to longstanding uncontrolled HTN  - Renal CT with contrast ordered to assess renal mass on Renal US from [facility]    # HLD   Most recent lipid panel 3/8/23: , , HDL 44,   - Lipitor 20mg daily  - Repeat lipid panel; consider increasing to 40mg or adding fenofibrate at follow up visit      Follow up:  6 months  Chart review time today: 15 minutes  Visit time today: 25 minutes  Total time spent today: 40 minutes        Margarette Boyd PA-C  General Cardiology   05/14/24      "

## 2024-05-14 ENCOUNTER — OFFICE VISIT (OUTPATIENT)
Dept: CARDIOLOGY | Facility: CLINIC | Age: 49
End: 2024-05-14
Payer: COMMERCIAL

## 2024-05-14 VITALS
DIASTOLIC BLOOD PRESSURE: 92 MMHG | SYSTOLIC BLOOD PRESSURE: 188 MMHG | HEART RATE: 82 BPM | OXYGEN SATURATION: 100 % | BODY MASS INDEX: 41.17 KG/M2 | WEIGHT: 309.9 LBS

## 2024-05-14 DIAGNOSIS — I49.9 IRREGULAR HEART BEAT: ICD-10-CM

## 2024-05-14 DIAGNOSIS — I10 BENIGN ESSENTIAL HYPERTENSION: Primary | ICD-10-CM

## 2024-05-14 DIAGNOSIS — N28.89 RENAL MASS: ICD-10-CM

## 2024-05-14 PROCEDURE — 93000 ELECTROCARDIOGRAM COMPLETE: CPT

## 2024-05-14 PROCEDURE — 99215 OFFICE O/P EST HI 40 MIN: CPT

## 2024-05-14 RX ORDER — SERTRALINE HYDROCHLORIDE 25 MG/1
25 TABLET, FILM COATED ORAL DAILY
COMMUNITY

## 2024-05-14 RX ORDER — AMLODIPINE AND BENAZEPRIL HYDROCHLORIDE 10; 40 MG/1; MG/1
1 CAPSULE ORAL DAILY
Qty: 90 CAPSULE | Refills: 3 | Status: SHIPPED | OUTPATIENT
Start: 2024-05-14

## 2024-05-14 NOTE — NURSING NOTE
"Chief Complaint   Patient presents with    Follow Up     Follow up       Initial BP (!) 191/100 (BP Location: Right arm, Patient Position: Chair, Cuff Size: Adult Large)   Pulse 82   Wt 140.6 kg (309 lb 14.4 oz)   SpO2 100%   BMI 41.17 kg/m   Estimated body mass index is 41.17 kg/m  as calculated from the following:    Height as of 12/1/23: 1.848 m (6' 0.75\").    Weight as of this encounter: 140.6 kg (309 lb 14.4 oz)..  BP completed using cuff size: lee PENNINGTON, VF     "

## 2024-05-14 NOTE — PATIENT INSTRUCTIONS
Take your medicines every day, as directed     Changes made today:  Prescription sent for Amlodipine-benazepril (10-40 mg) take 1 capsule daily.  Recheck echocardiogram ( 6-12 months   Complete renal CT        Cardiology Care Coordinators:      Margarette FLORES RN     Cardiology Rooming staff:  Sallie INGRAM EMT    Phone  [phone]      Fax [phone]    To Contact us     During Business Hours:  [phone]     If you are needing refills please contact your pharmacy.     For urgent after hour care please call the [facility] at [phone] or the [facility] at [phone] and ask to speak to the cardiologist on call.            HOW TO CHECK YOUR BLOOD PRESSURE AT HOME:     Avoid eating, smoking, and exercising for at least 30 minutes before taking a reading.     Be sure you have taken your BP medication at least 2-3 hours before you check it.      Sit quietly for 10 minutes before a reading.      Sit in a chair with your feet flat on the floor. Rest your  arm on a table so that the arm cuff is at the same level as your heart.     Remain still during the reading.  Record your blood pressure and pulse in a log and bring to your next appointment.       Use [employer] allows you to communicate directly with your heart team through secure messaging.  geolad can be accessed any time on your phone, computer, or tablet.  If you need assistance, we'd be happy to help!             Keep your Heart Appointments:     Follow-up in 6 months

## 2024-05-14 NOTE — PROGRESS NOTES
Received medical records via fax from [facility], faxed medical records to [facility] department. Medical records will be stored in clinic until uploaded to Epic and are available to the provider upon request.     Sallie PENNINGTON, VF

## 2024-05-15 ENCOUNTER — TELEPHONE (OUTPATIENT)
Dept: CARDIOLOGY | Facility: CLINIC | Age: 49
End: 2024-05-15
Payer: COMMERCIAL

## 2024-05-15 NOTE — TELEPHONE ENCOUNTER
Called patient to schedule 6-12 month echocardiogram and transfer to central imaging to schedule Renal Ultrasound. Left voice mail directing to patient line.    Sallie PENNINGTON, VF

## 2024-05-15 NOTE — TELEPHONE ENCOUNTER
----- Message from Margarette Vazquez RN sent at 5/14/2024  8:36 PM CDT -----  Hi there,     Pt needs to schedule a Renal CT. Echo in 6-12 months. He is already scheduled to see Margarette in 6 months.     Thanks !

## 2024-05-15 NOTE — NURSING NOTE
Cardiac Testing  -Complete an echocardiogram in 6 to 12 months .   - Complete a renal CT     Return Appointment:   -Follow-up in 6 months     Medication Change:   - Increase hydrochlorothiazide-benazepril   to (10-40 mg) take 1 capsule daily    Patient stated he understood all health information given and agreed to call with further questions or concerns.

## 2024-05-17 NOTE — TELEPHONE ENCOUNTER
Called patient to help schedule 6-12 month echo and CT Renal. Patient noted it was a bad time to call. Gave patient central scheduling number so he could call at his convenience.     Sallie PENNINGTON, VF

## 2024-05-21 NOTE — TELEPHONE ENCOUNTER
Sent [employer] message to patient to schedule echo in 6-12 months and have a renal CT as well.     Max attempts to reach patient reached. Closing encounter.     Yoselyn Castillo CMA ([facility])

## 2024-06-30 ENCOUNTER — HEALTH MAINTENANCE LETTER (OUTPATIENT)
Age: 49
End: 2024-06-30

## 2024-11-16 ENCOUNTER — HEALTH MAINTENANCE LETTER (OUTPATIENT)
Age: 49
End: 2024-11-16

## 2024-11-26 ENCOUNTER — TELEPHONE (OUTPATIENT)
Dept: CARDIOLOGY | Facility: CLINIC | Age: 49
End: 2024-11-26
Payer: COMMERCIAL

## 2025-02-26 ENCOUNTER — TELEPHONE (OUTPATIENT)
Dept: CARDIOLOGY | Facility: CLINIC | Age: 50
End: 2025-02-26
Payer: COMMERCIAL

## 2025-03-08 ENCOUNTER — HEALTH MAINTENANCE LETTER (OUTPATIENT)
Age: 50
End: 2025-03-08

## 2025-06-22 ENCOUNTER — HEALTH MAINTENANCE LETTER (OUTPATIENT)
Age: 50
End: 2025-06-22